# Patient Record
Sex: FEMALE | URBAN - METROPOLITAN AREA
[De-identification: names, ages, dates, MRNs, and addresses within clinical notes are randomized per-mention and may not be internally consistent; named-entity substitution may affect disease eponyms.]

---

## 2020-08-05 ENCOUNTER — TELEPHONE (OUTPATIENT)
Dept: SLEEP MEDICINE | Age: 60
End: 2020-08-05

## 2020-08-05 NOTE — TELEPHONE ENCOUNTER
Spoke to mom and left message regarding today's appointment. Spoke to [facility] at Dr. Irasema Elizabeth and they do not have any other listed contact number for patient but the home number. Cancelled today's appointment with Dr. Bette Riggs as we were unable to confirm this with patient. Letter sent for patient to call our office to reschedule.